# Patient Record
Sex: MALE | Race: WHITE
[De-identification: names, ages, dates, MRNs, and addresses within clinical notes are randomized per-mention and may not be internally consistent; named-entity substitution may affect disease eponyms.]

---

## 2018-11-19 ENCOUNTER — HOSPITAL ENCOUNTER (EMERGENCY)
Dept: HOSPITAL 97 - ER | Age: 38
Discharge: HOME | End: 2018-11-19
Payer: COMMERCIAL

## 2018-11-19 DIAGNOSIS — Z79.01: ICD-10-CM

## 2018-11-19 DIAGNOSIS — Z86.711: ICD-10-CM

## 2018-11-19 DIAGNOSIS — N20.0: Primary | ICD-10-CM

## 2018-11-19 DIAGNOSIS — Z86.718: ICD-10-CM

## 2018-11-19 DIAGNOSIS — Z79.899: ICD-10-CM

## 2018-11-19 LAB
BUN BLD-MCNC: 20 MG/DL (ref 7–18)
GLUCOSE SERPLBLD-MCNC: 95 MG/DL (ref 74–106)
HCT VFR BLD CALC: 36.6 % (ref 39.6–49)
INR BLD: 1.02
LYMPHOCYTES # SPEC AUTO: 1.7 K/UL (ref 0.7–4.9)
MCH RBC QN AUTO: 29.8 PG (ref 27–35)
MCV RBC: 89 FL (ref 80–100)
PMV BLD: 8.9 FL (ref 7.6–11.3)
POTASSIUM SERPL-SCNC: 3.8 MMOL/L (ref 3.5–5.1)
RBC # BLD: 4.11 M/UL (ref 4.33–5.43)
UA COMPLETE W REFLEX CULTURE PNL UR: (no result)

## 2018-11-19 PROCEDURE — 76377 3D RENDER W/INTRP POSTPROCES: CPT

## 2018-11-19 PROCEDURE — 80048 BASIC METABOLIC PNL TOTAL CA: CPT

## 2018-11-19 PROCEDURE — 81015 MICROSCOPIC EXAM OF URINE: CPT

## 2018-11-19 PROCEDURE — 85025 COMPLETE CBC W/AUTO DIFF WBC: CPT

## 2018-11-19 PROCEDURE — 85610 PROTHROMBIN TIME: CPT

## 2018-11-19 PROCEDURE — 74176 CT ABD & PELVIS W/O CONTRAST: CPT

## 2018-11-19 PROCEDURE — 36415 COLL VENOUS BLD VENIPUNCTURE: CPT

## 2018-11-19 PROCEDURE — 81003 URINALYSIS AUTO W/O SCOPE: CPT

## 2018-11-19 PROCEDURE — 99284 EMERGENCY DEPT VISIT MOD MDM: CPT

## 2018-11-19 NOTE — XMS REPORT
Clinical Summary

 Created on:2018



Patient:Jose Maciel

Sex:Male

:1980

External Reference #:[id number]





Demographics







 Address  [address]



   [place] [address]

 

 Home Phone  [phone]

 

 Mobile Phone  [phone]

 

 Email Address  [email or web address]

 

 Preferred Language  English

 

 Marital Status  

 

 Restoration Affiliation  Unknown

 

 Race  White

 

 Ethnic Group  Not  or 









Author







 Organization  [facility]

 

 Address  [address] [place] [id number]









Support







 Name  Relationship  Address  Phone

 

 Slime Maciel  Spouse  [address]  [phone]



     [place] [address]  









Care Team Providers







 Name  Role  Phone

 

 Johnson Anderson MD  Primary Care Provider  [phone]









Allergies

No Known Allergies



Medications

Not on file



Active Problems

Not on file



Social History







 Tobacco Use  Types  Packs/Day  Years Used  Date

 

 Never Assessed        









 Sex Assigned at Birth  Date Recorded

 

 Not on file  









 Job Start Date  Occupation  Industry

 

 Not on file  Not on file  Not on file









 Travel History  Travel Start  Travel End









 No recent travel history available.







Last Filed Vital Signs

Not on file



Plan of Treatment







 Health Maintenance  Due Date  Last Done  Comments

 

 MMR VACCINES (1 of 1 - Standard  1980    



 series)      

 

 VARICELLA VACCINES (1 of 2 - 2-dose  1992    



 adolescent series)      

 

 INFLUENZA VACCINE  2018    

 

 HEPATITIS B VACCINES  Aged Out    No longer eligible based on



       patient's age to complete



       this topic

 

 IPV VACCINES  Aged Out    No longer eligible based on



       patient's age to complete



       this topic

 

 MENINGOCOCCAL VACCINE  Aged Out    No longer eligible based on



       patient's age to complete



       this topic







Results

Not on fileafter 2017



Insurance







 Payer  Benefit Plan / Group  Subscriber ID  Type  Phone  Address

 

 BCBS  BCBS CHOICE PPO/FEDERAL EMPL PPO  xxxxxxxxxxxx  PPO    









 Guarantor Name  Account Type  Relation to  Date of Birth  Phone  Billing



     Patient      Address

 

 Jose Maciel  Personal/Family  Self  1980  [phone]  [address]



         (Home)  [address]







           [place]



           [address]







Advance Directives

Patient has advance care planning documents on file. For more information, 
please contact:[facility] [place] [address]

## 2018-11-19 NOTE — ER
Nurse's Notes                                                                                     

 [facility]                                                                

Name: Jose Maciel                                                                                   

Age: 38 yrs                                                                                       

Sex: Male                                                                                         

: 1980                                                                                   

MRN: [id number]                                                                                   

Arrival Date: 2018                                                                          

Time: 17:32                                                                                       

Account#: [id number]                                                                            

Bed 14                                                                                            

Private MD: KARYN Cuellar C                                                                            

Diagnosis: Calculus of kidney;Hematuria                                                           

                                                                                                  

Presentation:                                                                                     

                                                                                             

17:37 Presenting complaint: Patient states: hematuria since Saturday, RLQ and right flank     sv  

      pain. No trouble urinating. Pain originally started in the right low back. Reports he       

      stopped taking his blood thinner medication yesterday. Transition of care: patient was      

      not received from another setting of care. Onset of symptoms was 2018.         

      Care prior to arrival: None.                                                                

17:37 Method Of Arrival: Ambulatory                                                           sv  

17:37 Acuity: CAROLANN 3                                                                           sv  

18:00 Risk Assessment: Do you want to hurt yourself or someone else? Patient reports no       hj  

      desire to harm self or others. Initial Sepsis Screen: Does the patient meet any 2           

      criteria? No. Patient's initial sepsis screen is negative. Does the patient have a          

      suspected source of infection? No. Patient's initial sepsis screen is negative.             

                                                                                                  

Triage Assessment:                                                                                

18:00 General: Appears in no apparent distress. uncomfortable, obese, Behavior is calm,       hj  

      cooperative, appropriate for age. Pain: Complains of pain in abdomen. EENT: No signs        

      and/or symptoms were reported regarding the EENT system. Neuro: Level of Consciousness      

      is awake, alert, obeys commands, Oriented to person, place, time, situation,                

      Appropriate for age. Cardiovascular: Heart tones S1 S2 Capillary refill < 3 seconds         

      Patient's skin is warm and dry. Respiratory: Reports shortness of breath Airway is          

      patent Respiratory effort is even, unlabored. GI: No signs and/or symptoms were             

      reported involving the gastrointestinal system. : No signs and/or symptoms were           

      reported regarding the genitourinary system. Derm: No signs and/or symptoms reported        

      regarding the dermatologic system. Musculoskeletal: No signs and/or symptoms reported       

      regarding the musculoskeletal system.                                                       

                                                                                                  

Historical:                                                                                       

- Allergies:                                                                                      

17:42 No Known Allergies;                                                                     sv  

- Home Meds:                                                                                      

17:42 Xarelto oral oral [Active]; Diamox Sequels 500 mg Oral cpER [Active];                   sv  

      hydrocodone-acetaminophen  mg Oral tab [Active]; Bydureon subcutaneous                

      subcutaneous [Active]; trazodone 150 mg Oral tab [Active]; Celexa Oral [Active]; Ambien     

      10 mg Oral tab [Active]; Lasix 20 mg Oral tab 1 tab once daily [Active];                    

17:42 metformin 500 mg Oral tab 1 tab 2 times per day [Active];                               sv  

- PMHx:                                                                                           

17:42 PE; kidney clots; Left leg DVT; right arm DVT; ICP;                                     sv  

- PSHx:                                                                                           

17:42 Appendectomy; Adenoids; Tonsillectomy; Ear Tubes; eye surgery;                          sv  

                                                                                                  

- Immunization history:: Flu vaccine is not up to date.                                           

- Social history:: Smoking status: Patient/guardian denies using tobacco.                         

- Ebola Screening: : No symptoms or risks identified at this time.                                

                                                                                                  

                                                                                                  

Screenin:00 Abuse screen: Denies threats or abuse. Denies injuries from another. Nutritional        hj  

      screening: No deficits noted. Tuberculosis screening: No symptoms or risk factors           

      identified. Fall Risk None identified.                                                      

                                                                                                  

Assessment:                                                                                       

18:00 GI: Bowel sounds present X 4 quads. Abd is soft and non tender.                         hj  

18:00 General: Appears in no apparent distress. uncomfortable, Behavior is calm, cooperative, hj  

      appropriate for age. Pain: Complains of pain in abdomen. Neuro: Level of Consciousness      

      is awake, alert, obeys commands, Oriented to person, place, time, situation,                

      Appropriate for age. Cardiovascular: Capillary refill < 3 seconds Patient's skin is         

      warm and dry. Respiratory: Airway is patent Respiratory effort is even, unlabored,          

      Respiratory pattern is regular, symmetrical. : No signs and/or symptoms were reported     

      regarding the genitourinary system. EENT: No signs and/or symptoms were reported            

      regarding the EENT system. Derm: No signs and/or symptoms reported regarding the            

      dermatologic system. Musculoskeletal: No signs and/or symptoms reported regarding the       

      musculoskeletal system.                                                                     

18:05 Reassessment: sent to CT;.                                                              hj  

18:36 Reassessment: Patient and/or family updated on plan of care and expected duration. Pain hj  

      level reassessed. Patient is alert, oriented x 3, equal unlabored respirations, skin        

      warm/dry/pink. awaiting results and POC;.                                                   

19:38 Reassessment: Patient appears in no apparent distress at this time. Patient and/or      jb4 

      family updated on plan of care and expected duration. Pain level reassessed. Patient is     

      alert, oriented x 3, equal unlabored respirations, skin warm/dry/pink.                      

                                                                                                  

Vital Signs:                                                                                      

17:42  / 91; Pulse 75; Resp 20; Temp 98.4; Pulse Ox 100% ; Weight 112.49 kg; Height 5   sv  

      ft. 9 in. (175.26 cm); Pain 5/10;                                                           

18:14  / 89; Pulse 74; Resp 18; Pulse Ox 100% on R/A;                                   hj  

18:37  / 80; Pulse 72; Resp 18; Pulse Ox 100% on R/A;                                   hj  

19:38  / 89; Pulse 78; Resp 16; Pulse Ox 99% on R/A;                                    jb4 

17:42 Body Mass Index 36.62 (112.49 kg, 175.26 cm)                                            sv  

                                                                                                  

ED Course:                                                                                        

17:32 Patient arrived in ED.                                                                  rg4 

17:32 KARYN Cuellar MD is Private Physician.                                                       rg4 

17:39 Triage completed.                                                                       sv  

17:42 Arm band placed on.                                                                     sv  

17:43 Artemio Nicole RN is Primary Nurse.                                                    hj  

17:45 Thomas Calix MD is Attending Physician.                                              gs  

18:00 Patient has correct armband on for positive identification. Placed in gown. Bed in low  hj  

      position. Call light in reach. Side rails up X 1. Adult w/ patient.                         

18:05 Initial lab(s) drawn, by me, sent to lab. Inserted saline lock: 22 gauge in left        hj  

      antecubital area, using aseptic technique. Blood collected.                                 

18:08 Protime (+inr) Sent.                                                                    hj  

18:09 Basic Metabolic Panel Sent.                                                             hj  

18:09 CBC with Diff Sent.                                                                     hj  

18:27 CT completed. Patient tolerated procedure well. Patient moved back from CT.             nj  

19:26 Urine Dipstick--Ancillary (enter results) Sent.                                         mw2 

19:30 See Diggs MD is Referral Physician.                                              gs  

19:44 No provider procedures requiring assistance completed. IV discontinued, intact,         jb4 

      bleeding controlled.                                                                        

                                                                                                  

Administered Medications:                                                                         

No medications were administered                                                                  

                                                                                                  

                                                                                                  

Outcome:                                                                                          

19:31 Discharge ordered by MD.                                                                gs  

19:44 Discharged to home ambulatory.                                                          jb4 

19:44 Condition: stable                                                                           

19:44 Discharge instructions given to patient, family, Instructed on discharge instructions,      

      follow up and referral plans. Demonstrated understanding of instructions, follow-up         

      care.                                                                                       

19:45 Patient left the ED.                                                                    jb4 

                                                                                                  

Signatures:                                                                                       

Monica Vargas, RN                    RN   Artemio Zhao RN RN hj Garcia, Rubi                                 rg4                                                  

Norberto Pires RN RN   jb4                                                  

Francisco Oscar Gregory, MD MD gs [place], MyKena                            mw2                                                  

                                                                                                  

Corrections: (The following items were deleted from the chart)                                    

19:45 19:38 Reassessment: Patient appears in no apparent distress at this time. Patient       jb4 

      and/or family updated on plan of care and expected duration. Pain level reassessed.         

      Patient is alert, oriented x 3, equal unlabored respirations, skin warm/dry/pink. jb4       

                                                                                                  

**************************************************************************************************

## 2018-11-19 NOTE — XMS REPORT
Continuity of Care Document

 Created on:2018



Patient:YARELI MCKENO

Sex:Male

:1980

External Reference #:[id number]





Demographics







 Address  [address] [place] [address] 



   [place] [address]

 

 Phone  [phone]

 

 Preferred Language  Unknown

 

 Marital Status  Unknown

 

 Hindu Affiliation  Unknown

 

 Race  Unknown

 

 Ethnic Group  Unknown









Author







 Organization  [facility]









Problems







 Problem  Status  Onset  Classification  Date  Comments  Source



     Date    Reported    



             



             

 

 Joint pain  Active    Diagnosis  2017    Flakito



             [place]



             



             

 

 Hyperglycemia  Active    Diagnosis  10/10/2017    Flakito



             [place]



             



             







Medications







 Medication  Details  Route  Status  Patient  Ordering  Order  Source



         Instructions  Provider  Date  



               



               



               

 

 Prednisone  3 tablets  NA  Active  5mg  Andreina  10/05/20  Flakito



 Taper  for 5          17  Quintana



   days, 2            



   tablets            



   for 5 days            



   and then 1            



   tablet for            



   5 days            



               



               

 

 Ambien  1 tablet  Orally  Active  10 MG Orally  Andreina    Flakito



   at bedtime      Once a day      Quintana



   as needed            



               



               

 

 Celexa  0.5 tablet  Orally  Active  40 MG Orally  Andreina    Flakito



         Once a day      Quintana



               



               

 

 Hydrocodone-A  1 tablet  Orally  Active   MG  Andreina    Flakito



 cetaminophen  as needed      Orally every 6      Quintana



         hrs      



               

 

 Aleve  1 tablet  Orally  Active  220 MG Orally  Andreina    Flakito



   as needed      every 12 hrs      [facility]ana



               



               

 

 Trazodone HCl  1 tablet  Orally  Active  150 MG Orally  Andreina    Flakito



   at bedtime      Once a day      Quintana



   as needed            



               



               







Allergies, Adverse Reactions, Alerts







 Substance  Category  Reaction  Severity  Reaction  Status  Date  Comments  
Source



         type    Reported    



                 



                 



                 

 

 N.K.D.A.  Adverse  Info Not    Adverse  Active  10/23/201    Flakito



   Reaction  Available    Reaction    7    [place]



                 



                 







Immunizations







 Immunization  Date Given  Site  Status  Last Updated  Comments  Source



             



             







Results







 Order  Results  Value  Reference  Date  Interpretation  Comments  Source



 Name      Range        



               



               







Vital Signs







 Vital Sign  Value  Date  Comments  Source



         

 

 Weight  240  10/23/2017    Flakito [place]ana



         



         

 

 Height  69  10/23/2017    Flakito Quintana



         



         

 

 Temperature Oral (F)  98.1 F  10/23/2017    Flakito Quintana



         



         

 

 Heart Rate  86  10/23/2017    Flakito Quintana



         



         

 

 Diastolic (mm Hg)  82  10/23/2017    Flakito Quintana



         



         

 

 Systolic (mm Hg)  138  10/23/2017    Flakito Quintana



         



         

 

 Weight  239  10/05/2017    Flakito Quintana



         



         

 

 Height  69  10/05/2017    Flakito Quintana



         



         

 

 Temperature Oral (F)  97.5 F  10/05/2017    Flakito Quintana



         



         

 

 Heart Rate  82  10/05/2017    Flakito Quintana



         



         

 

 Diastolic (mm Hg)  70  10/05/2017    Flakito Quintana



         



         

 

 Systolic (mm Hg)  130  10/05/2017    Flakito Quintana



         



         







Encounters







 Location  Location  Encounter  Encounter  Reason  Attending  ADM  DC  Status  
Source



   Details  Type  Number  For  Provider  Date  Date    



         Visit          



                   



                   



                   

 

     Outpatient  [id number]    JESSICA      [facility]        [place]



                   



                   



                   



                   

 

     Outpatient  [id number]    JESSICA      [facility]        [place]



                   



                   



                   



                   

 

     Outpatient  [id number]    JESSICA      [facility]        [place]



                   



                   



                   



                   

 

     Outpatient  [id number]    JESSICA      [facility]        [place]



                   



                   



                   



                   

 

     Outpatient  [id number]    JESSICA      [facility]        [place]



                   



                   



                   



                   







Procedures







 Procedure  Code  Date  Perfomer  Comments  Source

## 2018-11-19 NOTE — XMS REPORT
Chucho Quintana MD

 Created on:2017



Patient:YARELI MCKEON

Sex:Male

:1980

External Reference #:[id number]





Demographics







 Address  [address] [place] [id number]

 

 Phone  [phone]

 

 Preferred Language  Unknown

 

 Marital Status  Unknown

 

 Episcopalian Affiliation  Unknown

 

 Race  Unknown

 

 Ethnic Group  Unknown









Author







 Organization  [employer]









Care Team Providers







 Name  Role  Phone

 

 Chucho Quintana  Provider Role  Unavailable









Allergies

No Known Allergies



Problems







 Problem Type  Condition  Code  Onset Dates  Condition Status

 

 Assessment  Joint pain  M25.50    Active

 

 Assessment  Hyperglycemia  R73.9    Active

 

 Problem  Joint pain  M25.50    Active







Medications

No Known Medications



Results

No Known Results



Summary Purpose

[employer] Submission

## 2018-11-19 NOTE — EDPHYS
Physician Documentation                                                                           

 [facility]                                                                

Name: Joes Maciel                                                                                   

Age: 38 yrs                                                                                       

Sex: Male                                                                                         

: 1980                                                                                   

MRN: [id number]                                                                                   

Arrival Date: 2018                                                                          

Time: 17:32                                                                                       

Account#: [id number]                                                                            

Bed 14                                                                                            

Private MD: KARYN Cuellar C                                                                            

ED Physician Thomas Calix                                                                       

HPI:                                                                                              

                                                                                             

19:29 This 38 yrs old  Male presents to ER via Ambulatory with complaints of Urinary gs  

      Problem, Abdominal Pain.                                                                    

19:29 The patient complains of pain in the right mid back. The pain radiates to the abdomen.  gs  

      Onset: The symptoms/episode began/occurred yesterday. Modifying factors: The symptoms       

      are alleviated by nothing. the symptoms are aggravated by nothing. Associated signs and     

      symptoms: Pertinent positives: hematuria. Severity of pain: At its worst the pain was       

      mild. The patient has not experienced similar symptoms in the past. The patient has         

      been recently seen by a physician: the patient's primary care provider, with similar        

      presenting complaints.                                                                      

                                                                                                  

Historical:                                                                                       

- Allergies:                                                                                      

17:42 No Known Allergies;                                                                     sv  

- Home Meds:                                                                                      

17:42 Xarelto oral oral [Active]; Diamox Sequels 500 mg Oral cpER [Active];                   sv  

      hydrocodone-acetaminophen  mg Oral tab [Active]; Bydureon subcutaneous                

      subcutaneous [Active]; trazodone 150 mg Oral tab [Active]; Celexa Oral [Active]; Ambien     

      10 mg Oral tab [Active]; Lasix 20 mg Oral tab 1 tab once daily [Active];                    

17:42 metformin 500 mg Oral tab 1 tab 2 times per day [Active];                               sv  

- PMHx:                                                                                           

17:42 PE; kidney clots; Left leg DVT; right arm DVT; ICP;                                     sv  

- PSHx:                                                                                           

17:42 Appendectomy; Adenoids; Tonsillectomy; Ear Tubes; eye surgery;                          sv  

                                                                                                  

- Immunization history:: Flu vaccine is not up to date.                                           

- Social history:: Smoking status: Patient/guardian denies using tobacco.                         

- Ebola Screening: : No symptoms or risks identified at this time.                                

                                                                                                  

                                                                                                  

ROS:                                                                                              

19:29 All other systems are negative.                                                         gs  

                                                                                                  

Exam:                                                                                             

19:29 Head/Face:  Normocephalic, atraumatic. Eyes:  Pupils equal round and reactive to light, gs  

      extra-ocular motions intact.  Lids and lashes normal.  Conjunctiva and sclera are           

      non-icteric and not injected.  Cornea within normal limits.  Periorbital areas with no      

      swelling, redness, or edema. ENT:  Nares patent. No nasal discharge, no septal              

      abnormalities noted.  Tympanic membranes are normal and external auditory canals are        

      clear.  Oropharynx with no redness, swelling, or masses, exudates, or evidence of           

      obstruction, uvula midline.  Mucous membranes moist. Neck:  Trachea midline, no             

      thyromegaly or masses palpated, and no cervical lymphadenopathy.  Supple, full range of     

      motion without nuchal rigidity, or vertebral point tenderness.  No Meningismus.             

      Chest/axilla:  Normal chest wall appearance and motion.  Nontender with no deformity.       

      No lesions are appreciated. Cardiovascular:  Regular rate and rhythm with a normal S1       

      and S2.  No gallops, murmurs, or rubs.  Normal PMI, no JVD.  No pulse deficits.             

      Respiratory:  Lungs have equal breath sounds bilaterally, clear to auscultation and         

      percussion.  No rales, rhonchi or wheezes noted.  No increased work of breathing, no        

      retractions or nasal flaring. Abdomen/GI:  Soft, non-tender, with normal bowel sounds.      

      No distension or tympany.  No guarding or rebound.  No evidence of tenderness               

      throughout. Back:  No spinal tenderness.  No costovertebral tenderness.  Full range of      

      motion. Skin:  Warm, dry with normal turgor.  Normal color with no rashes, no lesions,      

      and no evidence of cellulitis. MS/ Extremity:  Pulses equal, no cyanosis.                   

      Neurovascular intact.  Full, normal range of motion. Neuro:  Awake and alert, GCS 15,       

      oriented to person, place, time, and situation.  Cranial nerves II-XII grossly intact.      

      Motor strength 5/5 in all extremities.  Sensory grossly intact.  Cerebellar exam            

      normal.  Normal gait.                                                                       

19:29 Constitutional: The patient appears alert, awake.                                           

                                                                                                  

Vital Signs:                                                                                      

17:42  / 91; Pulse 75; Resp 20; Temp 98.4; Pulse Ox 100% ; Weight 112.49 kg; Height 5   sv  

      ft. 9 in. (175.26 cm); Pain 5/10;                                                           

18:14  / 89; Pulse 74; Resp 18; Pulse Ox 100% on R/A;                                   hj  

18:37  / 80; Pulse 72; Resp 18; Pulse Ox 100% on R/A;                                   hj  

19:38  / 89; Pulse 78; Resp 16; Pulse Ox 99% on R/A;                                    jb4 

17:42 Body Mass Index 36.62 (112.49 kg, 175.26 cm)                                            sv  

                                                                                                  

MDM:                                                                                              

17:54 Patient medically screened.                                                               

19:29 Differential diagnosis: nephrolithiasis, pyelonephritis, UTI. Data reviewed: vital      gs  

      signs, nurses notes. Response to treatment: the patient's symptoms have markedly            

      improved after treatment, and as a result, I will discharge patient.                        

                                                                                                  

                                                                                             

17:54 Order name: CBC with Diff                                                                 

                                                                                             

17:54 Order name: Basic Metabolic Panel                                                         

                                                                                             

17:54 Order name: Protime (+inr)                                                                

                                                                                             

18:08 Order name: Urine Dipstick--Ancillary (enter results)                                     

                                                                                             

17:46 Order name: Urine Dipstick-Ancillary (obtain specimen); Complete Time: 18:08              

                                                                                             

17:54 Order name: CT Stone Protocol                                                             

                                                                                             

18:23 Order name: CBC with Automated Diff; Complete Time: 19:08                               EDMS

                                                                                             

18:24 Order name: Protime (+INR); Complete Time: 19:08                                        EDMS

                                                                                             

18:28 Order name: Basic Metabolic Panel; Complete Time: 19:08                                 EDMS

                                                                                             

18:35 Order name: Urine Dipstick-Ancillary; Complete Time: 19:08                              EDMS

                                                                                             

18:37 Order name: CT; Complete Time: 19:08                                                    EDMS

                                                                                             

19:00 Order name: Urine Microscopic Only; Complete Time: 19:08                                EDMS

                                                                                                  

Administered Medications:                                                                         

No medications were administered                                                                  

                                                                                                  

                                                                                                  

Disposition:                                                                                      

18 19:31 Discharged to Home. Impression: Calculus of kidney, Hematuria.                     

- Condition is Stable.                                                                            

- Discharge Instructions: Hematuria, Adult, Kidney Stones, Lithotripsy.                           

                                                                                                  

- Medication Reconciliation Form, Thank You Letter, Antibiotic Education, Prescription            

  Opioid Use form.                                                                                

- Follow up: See [facility]gs MD; When: 2 - 3 days; Reason: Re-evaluation by your                 

  physician.                                                                                      

                                                                                                  

                                                                                                  

                                                                                                  

Signatures:                                                                                       

Dispatcher [employer]                           EDMS                                                 

Monica Vargas RN RN                                                      

Norberto Pires RN                       RN   jb4                                                  

Thomas Calix MD MD                                                      

                                                                                                  

Corrections: (The following items were deleted from the chart)                                    

17:48 17:46 UA MICROSCOPIC+U.LAB.BRZ ordered. EDMS                                            EDMS

17:56 17:56 UA MICROSCOPIC+U.LAB.BRZ ordered. EDMS                                            EDMS

19:45 19:31 2018 19:31 Discharged to Home. Impression: Calculus of kidney; Hematuria.   jb4 

      Condition is Stable. Forms are Medication Reconciliation Form, Thank You Letter,            

      Antibiotic Education, Prescription Opioid Use. Follow up: See Diggs; When: 2 - 3       

      days; Reason: Re-evaluation by your physician. gs                                           

                                                                                                  

**************************************************************************************************

## 2018-11-19 NOTE — RAD REPORT
EXAM DESCRIPTION:  CT - Stone Protocol - 11/19/2018 6:27 pm

 

CLINICAL HISTORY:  Flank pain.

ABD PAIN

 

COMPARISON:  CT ABD PELVIS W CONTRAST dated 9/10/2007

 

TECHNIQUE:  Axial images were obtained without oral or IV contrast. Lack of contrast limits solid org
an and vascular assessment. The field-of-view spans the entirety of the  system partially obscuring
 uppermost abdomen and lung bases. Coronal reformatted images were obtained and reviewed.

 

All CT scans are performed using dose optimization technique as appropriate and may include automated
 exposure control or mA/KV adjustment according to patient size.

 

FINDINGS:  The lower lung fields are clear.

 

Imaged portions of the liver and spleen show no suspicious findings on non-contrast imaging.Cholecyst
ectomy. The pancreas and adrenal glands are normal. No pathologic lymphadenopathy in the abdomen or p
barbara.

 

6 mm stone is present in the inferior calyx right kidney. No additional  stone seen. No hydronephro
sis.

 

No bowel obstruction, free air, free fluid or abscess. Appendectomy.

 

No significant bony abnormality.

 

IMPRESSION:  6 mm right renal calculus.